# Patient Record
Sex: MALE | Race: WHITE | Employment: FULL TIME | URBAN - NONMETROPOLITAN AREA
[De-identification: names, ages, dates, MRNs, and addresses within clinical notes are randomized per-mention and may not be internally consistent; named-entity substitution may affect disease eponyms.]

---

## 2018-02-23 ENCOUNTER — OFFICE VISIT (OUTPATIENT)
Dept: FAMILY MEDICINE CLINIC | Age: 38
End: 2018-02-23
Payer: COMMERCIAL

## 2018-02-23 ENCOUNTER — TELEPHONE (OUTPATIENT)
Dept: FAMILY MEDICINE CLINIC | Age: 38
End: 2018-02-23

## 2018-02-23 VITALS
WEIGHT: 165.4 LBS | HEART RATE: 94 BPM | SYSTOLIC BLOOD PRESSURE: 140 MMHG | BODY MASS INDEX: 24.5 KG/M2 | DIASTOLIC BLOOD PRESSURE: 68 MMHG | HEIGHT: 69 IN

## 2018-02-23 DIAGNOSIS — R00.9 ELEVATED HEART RATE WITH ELEVATED BLOOD PRESSURE WITHOUT DIAGNOSIS OF HYPERTENSION: ICD-10-CM

## 2018-02-23 DIAGNOSIS — M79.671 RIGHT FOOT PAIN: Primary | ICD-10-CM

## 2018-02-23 DIAGNOSIS — R03.0 ELEVATED HEART RATE WITH ELEVATED BLOOD PRESSURE WITHOUT DIAGNOSIS OF HYPERTENSION: ICD-10-CM

## 2018-02-23 DIAGNOSIS — F17.200 SMOKING: ICD-10-CM

## 2018-02-23 DIAGNOSIS — L24.89 IRRITANT CONTACT DERMATITIS DUE TO OTHER AGENTS: ICD-10-CM

## 2018-02-23 PROCEDURE — 99203 OFFICE O/P NEW LOW 30 MIN: CPT | Performed by: FAMILY MEDICINE

## 2018-02-23 RX ORDER — NAPROXEN 500 MG/1
500 TABLET ORAL 2 TIMES DAILY WITH MEALS
Qty: 60 TABLET | Refills: 0 | Status: SHIPPED | OUTPATIENT
Start: 2018-02-23 | End: 2019-01-18 | Stop reason: ALTCHOICE

## 2018-02-23 ASSESSMENT — ENCOUNTER SYMPTOMS
WHEEZING: 0
NAUSEA: 0
SHORTNESS OF BREATH: 0
VOMITING: 0
BACK PAIN: 0

## 2018-02-23 NOTE — PROGRESS NOTES
use Not on file      No current outpatient prescriptions on file. No current facility-administered medications for this visit. No Known Allergies  Health Maintenance   Topic Date Due    HIV screen  10/05/1994    DTaP/Tdap/Td vaccine (1 - Tdap) 10/05/1998    Flu vaccine (1) 09/01/2017       Objective:  BP (!) 140/68   Pulse 94   Ht 5' 9\" (1.753 m)   Wt 165 lb 6.4 oz (75 kg)   BMI 24.43 kg/m²   Physical Exam   Constitutional: He is oriented to person, place, and time. He appears well-developed and well-nourished. HENT:   Right Ear: External ear normal.   Left Ear: External ear normal.   Mouth/Throat: Oropharynx is clear and moist. No oropharyngeal exudate. Cardiovascular: Normal rate, regular rhythm and normal heart sounds. No murmur heard. Pulmonary/Chest: Effort normal and breath sounds normal. No respiratory distress. He has no wheezes. Abdominal: Soft. He exhibits no distension. There is no tenderness. Neurological: He is alert and oriented to person, place, and time. Skin: Skin is warm. Left axilla with raised erythematous area. No drainage. No vesicles. Psychiatric: His speech is normal and behavior is normal. His mood appears anxious. Vitals reviewed. Right foot pain:  ttp of medial ankle to medial foot. No ecchymosis. Full AROM in dorsiflexion, plantarflexion, inversion, and eversion. Strength is 5/5 dorsiflexion, 5/5 plantarflexion, 5/5 inversion, and 5/5 eversion. Negative bump test.  Negative squeeze test.  Distal sensation and pulses are intact in RLE. Impression/Plan:  1. Right foot pain  -right foot pain due to tarsal tunnel syndrome? Will refer back to podiatry for possible surgical management. - External Referral To Podiatry  -start naproxen (NAPROSYN) 500 MG tablet; Take 1 tablet by mouth 2 times daily (with meals)  Dispense: 60 tablet; Refill: 0    2.  Elevated heart rate with elevated blood pressure without diagnosis of hypertension  -monitor HR and BP. May be due to pain. 3. Smoking  Recommend cessation. Encouraged patient in his efforts to quit    4. Irritant contact dermatitis due to other agents  Recommend otc hydrocortisone cream on left axilla rash. He had changed deodarents recently. Call if not improving. They voiced understanding. All questions answered. They agreed with treatment plan. Educational materials given - see patient instructions. Discussed use, benefit, and side effects of prescribed medications. Reviewed health maintenance. Return in about 1 month (around 3/23/2018) for Well; nurse visit in 1 week for BP,.       Electronically signed by Zoya Meade MD on 2/23/2018 at 9:20 AM

## 2018-03-16 ENCOUNTER — TELEPHONE (OUTPATIENT)
Dept: FAMILY MEDICINE CLINIC | Age: 38
End: 2018-03-16

## 2018-03-23 ENCOUNTER — TELEPHONE (OUTPATIENT)
Dept: FAMILY MEDICINE CLINIC | Age: 38
End: 2018-03-23

## 2019-01-18 ENCOUNTER — OFFICE VISIT (OUTPATIENT)
Dept: FAMILY MEDICINE CLINIC | Age: 39
End: 2019-01-18
Payer: COMMERCIAL

## 2019-01-18 VITALS
WEIGHT: 187.2 LBS | HEIGHT: 69 IN | BODY MASS INDEX: 27.73 KG/M2 | SYSTOLIC BLOOD PRESSURE: 130 MMHG | HEART RATE: 84 BPM | DIASTOLIC BLOOD PRESSURE: 82 MMHG

## 2019-01-18 DIAGNOSIS — M54.2 NECK PAIN: Primary | ICD-10-CM

## 2019-01-18 DIAGNOSIS — M54.12 CERVICAL RADICULOPATHY: ICD-10-CM

## 2019-01-18 DIAGNOSIS — M48.02 FORAMINAL STENOSIS OF CERVICAL REGION: ICD-10-CM

## 2019-01-18 PROBLEM — E05.90 HYPERTHYROIDISM: Status: ACTIVE | Noted: 2019-01-18

## 2019-01-18 PROCEDURE — 99214 OFFICE O/P EST MOD 30 MIN: CPT | Performed by: FAMILY MEDICINE

## 2019-01-18 RX ORDER — METHIMAZOLE 10 MG/1
10 TABLET ORAL 2 TIMES DAILY
COMMUNITY
End: 2022-01-28

## 2019-01-18 RX ORDER — PREDNISONE 20 MG/1
20 TABLET ORAL 2 TIMES DAILY
Qty: 10 TABLET | Refills: 0 | Status: SHIPPED | OUTPATIENT
Start: 2019-01-18 | End: 2019-01-23

## 2019-01-18 RX ORDER — ATENOLOL 25 MG/1
25 TABLET ORAL DAILY
COMMUNITY
End: 2022-01-28 | Stop reason: ALTCHOICE

## 2019-01-18 RX ORDER — TIZANIDINE 4 MG/1
4 TABLET ORAL 3 TIMES DAILY
Qty: 30 TABLET | Refills: 0 | Status: SHIPPED | OUTPATIENT
Start: 2019-01-18 | End: 2019-02-14 | Stop reason: ALTCHOICE

## 2019-01-18 ASSESSMENT — PATIENT HEALTH QUESTIONNAIRE - PHQ9
SUM OF ALL RESPONSES TO PHQ QUESTIONS 1-9: 0
SUM OF ALL RESPONSES TO PHQ QUESTIONS 1-9: 0
SUM OF ALL RESPONSES TO PHQ9 QUESTIONS 1 & 2: 0
1. LITTLE INTEREST OR PLEASURE IN DOING THINGS: 0
2. FEELING DOWN, DEPRESSED OR HOPELESS: 0

## 2019-01-18 ASSESSMENT — ENCOUNTER SYMPTOMS: BACK PAIN: 1

## 2019-02-14 ENCOUNTER — OFFICE VISIT (OUTPATIENT)
Dept: FAMILY MEDICINE CLINIC | Age: 39
End: 2019-02-14
Payer: COMMERCIAL

## 2019-02-14 VITALS
BODY MASS INDEX: 27.77 KG/M2 | WEIGHT: 187.5 LBS | HEIGHT: 69 IN | HEART RATE: 80 BPM | SYSTOLIC BLOOD PRESSURE: 122 MMHG | DIASTOLIC BLOOD PRESSURE: 74 MMHG

## 2019-02-14 DIAGNOSIS — M54.2 NECK PAIN: Primary | ICD-10-CM

## 2019-02-14 DIAGNOSIS — M54.12 CERVICAL RADICULOPATHY: ICD-10-CM

## 2019-02-14 DIAGNOSIS — M48.02 FORAMINAL STENOSIS OF CERVICAL REGION: ICD-10-CM

## 2019-02-14 PROCEDURE — 99213 OFFICE O/P EST LOW 20 MIN: CPT | Performed by: FAMILY MEDICINE

## 2019-02-14 RX ORDER — GABAPENTIN 300 MG/1
300 CAPSULE ORAL 3 TIMES DAILY
Qty: 90 CAPSULE | Refills: 0 | Status: SHIPPED | OUTPATIENT
Start: 2019-02-14 | End: 2022-01-28

## 2019-03-07 ENCOUNTER — PROCEDURE VISIT (OUTPATIENT)
Dept: NEUROLOGY | Age: 39
End: 2019-03-07
Payer: COMMERCIAL

## 2019-03-07 DIAGNOSIS — M79.601 BILATERAL ARM PAIN: ICD-10-CM

## 2019-03-07 DIAGNOSIS — M79.602 BILATERAL ARM PAIN: ICD-10-CM

## 2019-03-07 DIAGNOSIS — M54.2 NECK PAIN: ICD-10-CM

## 2019-03-07 DIAGNOSIS — R20.0 BILATERAL HAND NUMBNESS: ICD-10-CM

## 2019-03-07 DIAGNOSIS — M54.12 CERVICAL RADICULOPATHY: Primary | ICD-10-CM

## 2019-03-07 PROCEDURE — 95911 NRV CNDJ TEST 9-10 STUDIES: CPT | Performed by: PSYCHIATRY & NEUROLOGY

## 2019-03-07 PROCEDURE — 95886 MUSC TEST DONE W/N TEST COMP: CPT | Performed by: PSYCHIATRY & NEUROLOGY

## 2019-03-13 ENCOUNTER — TELEPHONE (OUTPATIENT)
Dept: FAMILY MEDICINE CLINIC | Age: 39
End: 2019-03-13

## 2019-03-13 PROBLEM — G56.03 BILATERAL CARPAL TUNNEL SYNDROME: Status: ACTIVE | Noted: 2019-03-13

## 2019-05-16 ENCOUNTER — OFFICE VISIT (OUTPATIENT)
Dept: FAMILY MEDICINE CLINIC | Age: 39
End: 2019-05-16
Payer: COMMERCIAL

## 2019-05-16 VITALS
HEIGHT: 69 IN | BODY MASS INDEX: 28.17 KG/M2 | HEART RATE: 80 BPM | SYSTOLIC BLOOD PRESSURE: 130 MMHG | WEIGHT: 190.2 LBS | DIASTOLIC BLOOD PRESSURE: 78 MMHG

## 2019-05-16 DIAGNOSIS — Z13.1 SCREENING FOR DIABETES MELLITUS: ICD-10-CM

## 2019-05-16 DIAGNOSIS — F17.200 SMOKING: ICD-10-CM

## 2019-05-16 DIAGNOSIS — Z13.220 SCREENING FOR CHOLESTEROL LEVEL: ICD-10-CM

## 2019-05-16 DIAGNOSIS — Z00.00 WELL ADULT EXAM: Primary | ICD-10-CM

## 2019-05-16 PROCEDURE — 99395 PREV VISIT EST AGE 18-39: CPT | Performed by: FAMILY MEDICINE

## 2019-05-16 ASSESSMENT — ENCOUNTER SYMPTOMS
SHORTNESS OF BREATH: 0
ABDOMINAL PAIN: 0
NAUSEA: 0
BACK PAIN: 0
PHOTOPHOBIA: 0
SORE THROAT: 0
EYE DISCHARGE: 0
WHEEZING: 0

## 2019-05-16 NOTE — PROGRESS NOTES
[facility] PROFESSIONAL SERVS  [facility]  [address] [address]  Dept: [phone]  Dept Fax: [phone]  Loc: [phone]  PROGRESS NOTE      Visit Date: 5/16/2019    Evie Bradshaw is a 43 y.o. male who presents today for:  Chief Complaint   Patient presents with    Wellness Program       Subjective:  HPI     Well adult exam    Exercise:  His job. Diet:  No particular diet  Last Dentist appt:  1.5 years ago  Last optometry appt:  recent  Sleep:  Not good due to neck pain  Mood:  good  Other concerns:       Seen in [place] by endo for hyperthyroidism. Had radioactive Iodine treatment recently. Ongoing neck pain. Referred to [facility]. Works at [employer]    Review of Systems   Constitutional: Negative for fever and unexpected weight change. HENT: Negative for ear pain and sore throat. Eyes: Negative for photophobia and discharge. Respiratory: Negative for shortness of breath and wheezing. Cardiovascular: Negative for chest pain and leg swelling. Gastrointestinal: Negative for abdominal pain and nausea. Endocrine: Positive for heat intolerance. Negative for cold intolerance. Genitourinary: Negative for dysuria and frequency. Musculoskeletal: Positive for neck pain. Negative for back pain. Skin: Negative for pallor and rash. Neurological: Negative for syncope and light-headedness. Hematological: Negative for adenopathy. Does not bruise/bleed easily. Psychiatric/Behavioral: Positive for sleep disturbance. Negative for confusion. The patient is not nervous/anxious. Patient Active Problem List   Diagnosis    Smoking    Hyperthyroidism    Neck pain    Foraminal stenosis of cervical region    Cervical radiculopathy    Bilateral carpal tunnel syndrome     Past Medical History:   Diagnosis Date    Hyperthyroidism 2018      No past surgical history on file.   Family History   Problem Relation Age of Onset    Cancer Father Social History     Tobacco Use    Smoking status: Current Every Day Smoker     Packs/day: 0.50    Smokeless tobacco: Never Used    Tobacco comment: Trying to quit   Substance Use Topics    Alcohol use: Not on file      Current Outpatient Medications   Medication Sig Dispense Refill    atenolol (TENORMIN) 25 MG tablet Take 25 mg by mouth daily      methimazole (TAPAZOLE) 10 MG tablet Take 10 mg by mouth 2 times daily      gabapentin (NEURONTIN) 300 MG capsule Take 1 capsule by mouth 3 times daily for 30 days. Intended supply: 30 days. 90 capsule 0     No current facility-administered medications for this visit. No Known Allergies  Health Maintenance   Topic Date Due    TSH testing  1979    Pneumococcal 0-64 years Vaccine (1 of 1 - PPSV23) 10/05/1985    HIV screen  10/05/1994    DTaP/Tdap/Td vaccine (1 - Tdap) 10/05/1998    Flu vaccine (Season Ended) 09/01/2019       Objective:  /78 (Site: Left Upper Arm, Position: Sitting, Cuff Size: Large Adult)   Pulse 80   Ht 5' 9\" (1.753 m)   Wt 190 lb 3.2 oz (86.3 kg)   BMI 28.09 kg/m²   Physical Exam   Constitutional: He is oriented to person, place, and time. He appears well-developed and well-nourished. No distress. HENT:   Head: Normocephalic and atraumatic. Right Ear: External ear normal.   Left Ear: External ear normal.   Mouth/Throat: Oropharynx is clear and moist. No oropharyngeal exudate. Eyes: Right eye exhibits no discharge. Left eye exhibits no discharge. Cardiovascular: Normal rate, regular rhythm and normal heart sounds. Exam reveals no gallop and no friction rub. No murmur heard. Pulmonary/Chest: Effort normal and breath sounds normal. No respiratory distress. He has no wheezes. Abdominal: Soft. He exhibits no distension and no mass. There is no tenderness. There is no rebound and no guarding. Musculoskeletal: He exhibits no edema. Neurological: He is alert and oriented to person, place, and time.    Skin: Skin is warm. No rash noted. No erythema. Psychiatric: He has a normal mood and affect. His behavior is normal.   Vitals reviewed. Impression/Plan:  1. Well adult exam  Well adult  Preventative maintenance handout provided and discussed    2. Smoking  Recommend cessation    3. Screening for cholesterol level  - Lipid Panel; Future    4. Screening for diabetes mellitus  - Glucose, Fasting; Future    He states he has not been able to schedule appt with [facility] for his neck pain and is unsure why. We called [facility] and he is in collections and needs to pay prior to being seen for his neck pain    They voiced understanding. All questions answered. They agreed with treatment plan. See patient instructions for any educational materials that may have been given. Discussed use, benefit, and side effects of prescribed medications. Reviewed health maintenance. (Please note that portions of this note may have been completed with a voice recognition program.  Efforts were made to edit the dictation but occasionally words are mis-transcribed.)    Return in about 1 year (around 5/16/2020) for Well.       Electronically signed by Maryanna Severin, MD on 5/16/2019 at 10:17 AM

## 2019-05-16 NOTE — PATIENT INSTRUCTIONS
You may receive a survey regarding the care you received during your visit. Your input is valuable to us. We encourage you to complete and return your survey. We hope you will choose us in the future for your healthcare needs. Patient Education        Well Visit, Ages 25 to 48: Care Instructions  Your Care Instructions    Physical exams can help you stay healthy. Your doctor has checked your overall health and may have suggested ways to take good care of yourself. He or she also may have recommended tests. At home, you can help prevent illness with healthy eating, regular exercise, and other steps. Follow-up care is a key part of your treatment and safety. Be sure to make and go to all appointments, and call your doctor if you are having problems. It's also a good idea to know your test results and keep a list of the medicines you take. How can you care for yourself at home? · Reach and stay at a healthy weight. This will lower your risk for many problems, such as obesity, diabetes, heart disease, and high blood pressure. · Get at least 30 minutes of physical activity on most days of the week. Walking is a good choice. You also may want to do other activities, such as running, swimming, cycling, or playing tennis or team sports. Discuss any changes in your exercise program with your doctor. · Do not smoke or allow others to smoke around you. If you need help quitting, talk to your doctor about stop-smoking programs and medicines. These can increase your chances of quitting for good. · Talk to your doctor about whether you have any risk factors for sexually transmitted infections (STIs). Having one sex partner (who does not have STIs and does not have sex with anyone else) is a good way to avoid these infections. · Use birth control if you do not want to have children at this time. Talk with your doctor about the choices available and what might be best for you. · Protect your skin from too much sun.  When you're outdoors from 10 a.m. to 4 p.m., stay in the shade or cover up with clothing and a hat with a wide brim. Wear sunglasses that block UV rays. Even when it's cloudy, put broad-spectrum sunscreen (SPF 30 or higher) on any exposed skin. · See a dentist one or two times a year for checkups and to have your teeth cleaned. · Wear a seat belt in the car. Follow your doctor's advice about when to have certain tests. These tests can spot problems early. For everyone  · Cholesterol. Have the fat (cholesterol) in your blood tested after age 21. Your doctor will tell you how often to have this done based on your age, family history, or other things that can increase your risk for heart disease. · Blood pressure. Have your blood pressure checked during a routine doctor visit. Your doctor will tell you how often to check your blood pressure based on your age, your blood pressure results, and other factors. · Vision. Talk with your doctor about how often to have a glaucoma test.  · Diabetes. Ask your doctor whether you should have tests for diabetes. · Colon cancer. Your risk for colorectal cancer gets higher as you get older. Some experts say that adults should start regular screening at age 48 and stop at age 76. Others say to start before age 48 or continue after age 76. Talk with your doctor about your risk and when to start and stop screening. For women  · Breast exam and mammogram. Talk to your doctor about when you should have a clinical breast exam and a mammogram. Medical experts differ on whether and how often women under 50 should have these tests. Your doctor can help you decide what is right for you. · Cervical cancer screening test and pelvic exam. Begin with a Pap test at age 24. The test often is part of a pelvic exam. Starting at age 27, you may choose to have a Pap test, an HPV test, or both tests at the same time (called co-testing). Talk with your doctor about how often to have testing.   · Tests for sexually transmitted infections (STIs). Ask whether you should have tests for STIs. You may be at risk if you have sex with more than one person, especially if your partners do not wear condoms. For men  · Tests for sexually transmitted infections (STIs). Ask whether you should have tests for STIs. You may be at risk if you have sex with more than one person, especially if you do not wear a condom. · Testicular cancer exam. Ask your doctor whether you should check your testicles regularly. · Prostate exam. Talk to your doctor about whether you should have a blood test (called a PSA test) for prostate cancer. Experts differ on whether and when men should have this test. Some experts suggest it if you are older than 39 and are -American or have a father or brother who got prostate cancer when he was younger than 72. When should you call for help? Watch closely for changes in your health, and be sure to contact your doctor if you have any problems or symptoms that concern you. Where can you learn more? Go to https://[email or web address]healthSHADO. org and sign in to your [employer] account. Enter P072 in the [employer] box to learn more about \"Well Visit, Ages 25 to 48: Care Instructions. \"     If you do not have an account, please click on the \"Sign Up Now\" link. Current as of: December 13, 2018  Content Version: 12.0  © 9758-7569 Healthwise, Incorporated. Care instructions adapted under license by [facility] ([facility]). If you have questions about a medical condition or this instruction, always ask your healthcare professional. [place] [address] any warranty or liability for your use of this information.

## 2022-01-28 ENCOUNTER — OFFICE VISIT (OUTPATIENT)
Dept: FAMILY MEDICINE CLINIC | Age: 42
End: 2022-01-28
Payer: COMMERCIAL

## 2022-01-28 VITALS
HEIGHT: 69 IN | SYSTOLIC BLOOD PRESSURE: 148 MMHG | OXYGEN SATURATION: 97 % | BODY MASS INDEX: 28.73 KG/M2 | DIASTOLIC BLOOD PRESSURE: 86 MMHG | HEART RATE: 91 BPM | TEMPERATURE: 97.2 F | WEIGHT: 194 LBS | RESPIRATION RATE: 18 BRPM

## 2022-01-28 DIAGNOSIS — M54.12 CERVICAL RADICULOPATHY: ICD-10-CM

## 2022-01-28 DIAGNOSIS — M54.2 NECK PAIN: Primary | ICD-10-CM

## 2022-01-28 PROCEDURE — 99213 OFFICE O/P EST LOW 20 MIN: CPT | Performed by: FAMILY MEDICINE

## 2022-01-28 RX ORDER — CYCLOBENZAPRINE HCL 10 MG
10 TABLET ORAL 3 TIMES DAILY PRN
Qty: 30 TABLET | Refills: 0 | Status: SHIPPED | OUTPATIENT
Start: 2022-01-28 | End: 2022-02-07

## 2022-01-28 RX ORDER — PREDNISONE 20 MG/1
20 TABLET ORAL 2 TIMES DAILY
Qty: 10 TABLET | Refills: 0 | Status: SHIPPED | OUTPATIENT
Start: 2022-01-28 | End: 2022-02-02

## 2022-01-28 RX ORDER — SILDENAFIL CITRATE 20 MG/1
TABLET ORAL
COMMUNITY
Start: 2021-11-19

## 2022-01-28 RX ORDER — HYDROCODONE BITARTRATE AND ACETAMINOPHEN 5; 325 MG/1; MG/1
1 TABLET ORAL EVERY 4 HOURS PRN
COMMUNITY
Start: 2022-01-23 | End: 2022-01-28 | Stop reason: ALTCHOICE

## 2022-01-28 RX ORDER — DIAZEPAM 2 MG/1
2 TABLET ORAL EVERY 6 HOURS PRN
COMMUNITY
Start: 2022-01-23 | End: 2022-01-28 | Stop reason: ALTCHOICE

## 2022-01-28 RX ORDER — PANTOPRAZOLE SODIUM 20 MG/1
20 TABLET, DELAYED RELEASE ORAL DAILY
COMMUNITY
End: 2022-01-28 | Stop reason: ALTCHOICE

## 2022-01-28 SDOH — ECONOMIC STABILITY: FOOD INSECURITY: WITHIN THE PAST 12 MONTHS, YOU WORRIED THAT YOUR FOOD WOULD RUN OUT BEFORE YOU GOT MONEY TO BUY MORE.: NEVER TRUE

## 2022-01-28 SDOH — ECONOMIC STABILITY: FOOD INSECURITY: WITHIN THE PAST 12 MONTHS, THE FOOD YOU BOUGHT JUST DIDN'T LAST AND YOU DIDN'T HAVE MONEY TO GET MORE.: NEVER TRUE

## 2022-01-28 ASSESSMENT — PATIENT HEALTH QUESTIONNAIRE - PHQ9
1. LITTLE INTEREST OR PLEASURE IN DOING THINGS: 0
SUM OF ALL RESPONSES TO PHQ QUESTIONS 1-9: 0
SUM OF ALL RESPONSES TO PHQ9 QUESTIONS 1 & 2: 0
2. FEELING DOWN, DEPRESSED OR HOPELESS: 0

## 2022-01-28 ASSESSMENT — SOCIAL DETERMINANTS OF HEALTH (SDOH): HOW HARD IS IT FOR YOU TO PAY FOR THE VERY BASICS LIKE FOOD, HOUSING, MEDICAL CARE, AND HEATING?: NOT HARD AT ALL

## 2022-01-28 NOTE — PROGRESS NOTES
[facility]  [facility] MEDICINE  [address] [facility]  Dept: [phone]  Dept Fax: [phone]  Loc: [phone]  PROGRESS NOTE      Visit Date: 1/28/2022    Augie Ornelas is a 42 y.o. male who presents today for:  Chief Complaint   Patient presents with    Spasms     Neck, shoulders, and chest; 1 week onset       Subjective:  HPI    Neck, shoulder, chest pain. Started 1 week ago when he awoke with this. Seen at [address] [place] [address] ER on 1/23. Tried heating pad, icy hot, massage. He was treated with valium, ibuprofen, and norco (3 pills). had neck xrays. He was referred to PT but did not make appt yet. Sometimes gets numbing feeling in left arm. Had MRI c-spine in 2018. Seen in feb 2019 by this physician. Did not get appt at [facility].  EMG in 2019 showed C6-7 chronic radiculopathy    Review of Systems   Constitutional: Negative for chills and fever. Musculoskeletal: Positive for arthralgias and neck pain. Patient Active Problem List   Diagnosis    Smoking    Hyperthyroidism    Neck pain    Foraminal stenosis of cervical region    Cervical radiculopathy    Bilateral carpal tunnel syndrome     Past Medical History:   Diagnosis Date    Hyperthyroidism 2018      History reviewed. No pertinent surgical history. Family History   Problem Relation Age of Onset    Cancer Father      Social History     Tobacco Use    Smoking status: Current Every Day Smoker     Packs/day: 0.50    Smokeless tobacco: Never Used    Tobacco comment: Trying to quit   Substance Use Topics    Alcohol use: Not on file      Current Outpatient Medications   Medication Sig Dispense Refill    sildenafil (REVATIO) 20 MG tablet Take 3 tablets by mouth one hour prior to sex as needed. (Patient not taking: Reported on 1/28/2022)       No current facility-administered medications for this visit.      No Known Allergies  Health Maintenance   Topic Date Due    TSH testing Never done    Hepatitis C screen  Never done    COVID-19 Vaccine (1) Never done    Pneumococcal 0-64 years Vaccine (1 of 2 - PPSV23) Never done    Depression Screen  Never done    HIV screen  Never done    DTaP/Tdap/Td vaccine (1 - Tdap) Never done    Lipid screen  Never done    Flu vaccine (1) Never done    Hepatitis A vaccine  Aged Out    Hepatitis B vaccine  Aged Out    Hib vaccine  Aged Out    Meningococcal (ACWY) vaccine  Aged Out       Objective:  BP (!) 148/86 (Site: Left Upper Arm, Position: Sitting, Cuff Size: Medium Adult)   Pulse 91   Temp 97.2 °F (36.2 °C) (Temporal)   Resp 18   Ht 5' 9\" (1.753 m)   Wt 194 lb (88 kg)   SpO2 97%   BMI 28.65 kg/m²   Physical Exam  Vitals reviewed. Constitutional:       General: He is not in acute distress. Appearance: He is not ill-appearing. Comments: Appears uncomfortable   Neurological:      Mental Status: He is alert. Neck:  ttp of left upper trapezius with muscle spasm present. Very limited ROM for flex, ext, lateral flexion bilaterally and rotation bilaterally    Strength:  Right Upper Extremity  Left Upper Extremity  Shoulder abduction:  5/5    5/5  Elbow flexion:   5/5    5/5  Wrist extension:  5/5    5/5  Elbow extension:  5/5    5/5  Wrist flexion:   5/5    5/5  Finger abduction:  5/5    5/5      Impression/Plan:  1. Neck pain  Neck pain with hx of C6-C7 cervical radiculopathy. Very limited ROM. No weakness. Try prednisone and flexeril. Tylenol. Refer for PT  - predniSONE (DELTASONE) 20 MG tablet; Take 1 tablet by mouth 2 times daily for 5 days  Dispense: 10 tablet; Refill: 0  - External Referral To Physical Therapy  - cyclobenzaprine (FLEXERIL) 10 MG tablet; Take 1 tablet by mouth 3 times daily as needed for Muscle spasms  Dispense: 30 tablet; Refill: 0    2. Cervical radiculopathy  - predniSONE (DELTASONE) 20 MG tablet; Take 1 tablet by mouth 2 times daily for 5 days  Dispense: 10 tablet;  Refill: 0  - External Referral To Physical Therapy  - cyclobenzaprine (FLEXERIL) 10 MG tablet; Take 1 tablet by mouth 3 times daily as needed for Muscle spasms  Dispense: 30 tablet; Refill: 0    Recheck BP at next appt given neck pain    They voiced understanding. All questions answered. They agreed with treatment plan. See patient instructions for any educational materials that may have been given. Discussed use, benefit, and side effects of prescribed medications. Reviewed health maintenance. (Please note that portions of this note may have been completed with a voice recognition program.  Efforts were made to edit the dictation but occasionally words are mis-transcribed.)    Return in about 2 weeks (around 2/11/2022) for neck pain.       Electronically signed by Sienna Law MD on 1/28/2022 at 9:26 AM

## 2023-09-06 ENCOUNTER — OFFICE VISIT (OUTPATIENT)
Dept: FAMILY MEDICINE CLINIC | Age: 43
End: 2023-09-06
Payer: COMMERCIAL

## 2023-09-06 ENCOUNTER — HOSPITAL ENCOUNTER (OUTPATIENT)
Age: 43
Discharge: HOME | End: 2023-09-06
Payer: COMMERCIAL

## 2023-09-06 VITALS
WEIGHT: 200 LBS | BODY MASS INDEX: 29.62 KG/M2 | HEART RATE: 93 BPM | OXYGEN SATURATION: 97 % | DIASTOLIC BLOOD PRESSURE: 80 MMHG | SYSTOLIC BLOOD PRESSURE: 130 MMHG | RESPIRATION RATE: 18 BRPM | TEMPERATURE: 97 F | HEIGHT: 69 IN

## 2023-09-06 DIAGNOSIS — Z13.1 DIABETES MELLITUS SCREENING: ICD-10-CM

## 2023-09-06 DIAGNOSIS — E05.00 GRAVES DISEASE: ICD-10-CM

## 2023-09-06 DIAGNOSIS — R00.0 TACHYCARDIA: Primary | ICD-10-CM

## 2023-09-06 DIAGNOSIS — Z13.220 SCREENING FOR HYPERLIPIDEMIA: ICD-10-CM

## 2023-09-06 DIAGNOSIS — R00.0 TACHYCARDIA: ICD-10-CM

## 2023-09-06 LAB
BASOPHILS ABSOLUTE: 0.1 THOU/MM3 (ref 0–0.1)
BASOPHILS NFR BLD AUTO: 0.6 %
DEPRECATED RDW RBC AUTO: 43.5 FL (ref 35–45)
EOSINOPHIL NFR BLD AUTO: 5.3 %
EOSINOPHILS ABSOLUTE: 0.5 THOU/MM3 (ref 0–0.4)
ERYTHROCYTE [DISTWIDTH] IN BLOOD BY AUTOMATED COUNT: 12.5 % (ref 11.5–14.5)
HCT VFR BLD AUTO: 45.7 % (ref 42–52)
HGB BLD-MCNC: 15.3 GM/DL (ref 14–18)
IMM GRANULOCYTES # BLD AUTO: 0.04 THOU/MM3 (ref 0–0.07)
IMM GRANULOCYTES NFR BLD AUTO: 0.4 %
LYMPHOCYTES ABSOLUTE: 2 THOU/MM3 (ref 1–4.8)
LYMPHOCYTES NFR BLD AUTO: 21.5 %
MCH RBC QN AUTO: 31.9 PG (ref 26–33)
MCHC RBC AUTO-ENTMCNC: 33.5 GM/DL (ref 32.2–35.5)
MCV RBC AUTO: 95.4 FL (ref 80–94)
MONOCYTES ABSOLUTE: 0.9 THOU/MM3 (ref 0.4–1.3)
MONOCYTES NFR BLD AUTO: 9.4 %
NEUTROPHILS NFR BLD AUTO: 62.8 %
NRBC BLD AUTO-RTO: 0 /100 WBC
PLATELET # BLD AUTO: 222 THOU/MM3 (ref 130–400)
PMV BLD AUTO: 10.6 FL (ref 9.4–12.4)
RBC # BLD AUTO: 4.79 MILL/MM3 (ref 4.7–6.1)
SEGMENTED NEUTROPHILS ABSOLUTE COUNT: 5.7 THOU/MM3 (ref 1.8–7.7)
WBC # BLD AUTO: 9.1 THOU/MM3 (ref 4.8–10.8)

## 2023-09-06 PROCEDURE — 93000 ELECTROCARDIOGRAM COMPLETE: CPT | Performed by: FAMILY MEDICINE

## 2023-09-06 PROCEDURE — 84439 ASSAY OF FREE THYROXINE: CPT

## 2023-09-06 PROCEDURE — 80061 LIPID PANEL: CPT

## 2023-09-06 PROCEDURE — 99214 OFFICE O/P EST MOD 30 MIN: CPT | Performed by: FAMILY MEDICINE

## 2023-09-06 PROCEDURE — 36415 COLL VENOUS BLD VENIPUNCTURE: CPT

## 2023-09-06 PROCEDURE — 84443 ASSAY THYROID STIM HORMONE: CPT

## 2023-09-06 PROCEDURE — 85025 COMPLETE CBC W/AUTO DIFF WBC: CPT

## 2023-09-06 PROCEDURE — 84481 FREE ASSAY (FT-3): CPT

## 2023-09-06 PROCEDURE — 80053 COMPREHEN METABOLIC PANEL: CPT

## 2023-09-06 SDOH — ECONOMIC STABILITY: FOOD INSECURITY: WITHIN THE PAST 12 MONTHS, THE FOOD YOU BOUGHT JUST DIDN'T LAST AND YOU DIDN'T HAVE MONEY TO GET MORE.: NEVER TRUE

## 2023-09-06 SDOH — ECONOMIC STABILITY: INCOME INSECURITY: HOW HARD IS IT FOR YOU TO PAY FOR THE VERY BASICS LIKE FOOD, HOUSING, MEDICAL CARE, AND HEATING?: NOT HARD AT ALL

## 2023-09-06 SDOH — ECONOMIC STABILITY: HOUSING INSECURITY
IN THE LAST 12 MONTHS, WAS THERE A TIME WHEN YOU DID NOT HAVE A STEADY PLACE TO SLEEP OR SLEPT IN A SHELTER (INCLUDING NOW)?: NO

## 2023-09-06 SDOH — ECONOMIC STABILITY: FOOD INSECURITY: WITHIN THE PAST 12 MONTHS, YOU WORRIED THAT YOUR FOOD WOULD RUN OUT BEFORE YOU GOT MONEY TO BUY MORE.: NEVER TRUE

## 2023-09-06 ASSESSMENT — PATIENT HEALTH QUESTIONNAIRE - PHQ9
SUM OF ALL RESPONSES TO PHQ QUESTIONS 1-9: 2
SUM OF ALL RESPONSES TO PHQ9 QUESTIONS 1 & 2: 2
2. FEELING DOWN, DEPRESSED OR HOPELESS: 1
SUM OF ALL RESPONSES TO PHQ QUESTIONS 1-9: 2
1. LITTLE INTEREST OR PLEASURE IN DOING THINGS: 1
SUM OF ALL RESPONSES TO PHQ QUESTIONS 1-9: 2
SUM OF ALL RESPONSES TO PHQ QUESTIONS 1-9: 2

## 2023-09-06 ASSESSMENT — ENCOUNTER SYMPTOMS
SHORTNESS OF BREATH: 1
CONSTIPATION: 0
CHEST TIGHTNESS: 1
DIARRHEA: 0
WHEEZING: 0
COUGH: 1

## 2023-09-07 ENCOUNTER — TELEPHONE (OUTPATIENT)
Dept: FAMILY MEDICINE CLINIC | Age: 43
End: 2023-09-07

## 2023-09-07 DIAGNOSIS — E05.90 HYPERTHYROIDISM: Primary | ICD-10-CM

## 2023-09-07 LAB
ALBUMIN SERPL BCG-MCNC: 4.1 G/DL (ref 3.5–5.1)
ALP SERPL-CCNC: 99 U/L (ref 38–126)
ALT SERPL W/O P-5'-P-CCNC: 14 U/L (ref 11–66)
ANION GAP SERPL CALC-SCNC: 13 MEQ/L (ref 8–16)
AST SERPL-CCNC: 16 U/L (ref 5–40)
BILIRUB SERPL-MCNC: 0.3 MG/DL (ref 0.3–1.2)
BUN SERPL-MCNC: 19 MG/DL (ref 7–22)
CALCIUM SERPL-MCNC: 9.4 MG/DL (ref 8.5–10.5)
CHLORIDE SERPL-SCNC: 107 MEQ/L (ref 98–111)
CHOLEST SERPL-MCNC: 138 MG/DL (ref 100–199)
CO2 SERPL-SCNC: 26 MEQ/L (ref 23–33)
CREAT SERPL-MCNC: 0.9 MG/DL (ref 0.4–1.2)
GFR SERPL CREATININE-BSD FRML MDRD: > 60 ML/MIN/1.73M2
GLUCOSE SERPL-MCNC: 97 MG/DL (ref 70–108)
HDLC SERPL-MCNC: 45 MG/DL
LDLC SERPL CALC-MCNC: 81 MG/DL
POTASSIUM SERPL-SCNC: 4.3 MEQ/L (ref 3.5–5.2)
PROT SERPL-MCNC: 7 G/DL (ref 6.1–8)
SODIUM SERPL-SCNC: 146 MEQ/L (ref 135–145)
T4 FREE SERPL-MCNC: 1.15 NG/DL (ref 0.93–1.76)
TRIGL SERPL-MCNC: 62 MG/DL (ref 0–199)
TSH SERPL DL<=0.005 MIU/L-ACNC: 0.1 UIU/ML (ref 0.4–4.2)

## 2023-09-07 NOTE — TELEPHONE ENCOUNTER
----- Message from Deng Oliver MD sent at 9/7/2023  6:00 AM EDT -----  CMP and CBC are good. Cholesterol levels are excellent. Slightly low TSH with normal free T4. Indeterminate function (TSH being low indicates possible hypothyroidism but free T4 indicates normal thyroid levels). Free T3 pending. Recommend repeat TSH and free T4 in 4 weeks    Please advise patient.   Deng Oilver MD

## 2023-09-07 NOTE — TELEPHONE ENCOUNTER
Patient informed and verbalized understanding. Lab orders pended and will mail to patient once signed.

## 2023-09-08 LAB — T3FREE SERPL-MCNC: 3.21 PG/ML (ref 2.02–4.43)

## 2023-09-11 ENCOUNTER — TELEPHONE (OUTPATIENT)
Dept: FAMILY MEDICINE CLINIC | Age: 43
End: 2023-09-11

## 2023-09-11 NOTE — TELEPHONE ENCOUNTER
----- Message from Oneal Ribeiro MD sent at 9/9/2023  8:40 PM EDT -----  Free t3 is good. Please advise patient.   Oneal Ribeiro MD

## 2023-10-03 ENCOUNTER — HOSPITAL ENCOUNTER (OUTPATIENT)
Dept: NON INVASIVE DIAGNOSTICS | Age: 43
Discharge: HOME | End: 2023-10-03
Attending: FAMILY MEDICINE
Payer: COMMERCIAL

## 2023-10-03 DIAGNOSIS — R00.0 TACHYCARDIA: ICD-10-CM

## 2023-10-03 PROCEDURE — 93270 REMOTE 30 DAY ECG REV/REPORT: CPT

## 2023-10-03 NOTE — PROCEDURES
30 Day cardiac event monitor was applied to patient. Instructions were given and skin/monitor prep and application was demonstrated. Patient was instructed to remove monitor on 11/2 and mail back to [employer].

## 2023-10-26 ENCOUNTER — HOSPITAL ENCOUNTER (OUTPATIENT)
Age: 43
Discharge: HOME | End: 2023-10-26
Payer: COMMERCIAL

## 2023-10-26 ENCOUNTER — OFFICE VISIT (OUTPATIENT)
Dept: FAMILY MEDICINE CLINIC | Age: 43
End: 2023-10-26
Payer: COMMERCIAL

## 2023-10-26 VITALS
RESPIRATION RATE: 16 BRPM | SYSTOLIC BLOOD PRESSURE: 150 MMHG | HEART RATE: 94 BPM | DIASTOLIC BLOOD PRESSURE: 90 MMHG | HEIGHT: 69 IN | WEIGHT: 206.2 LBS | OXYGEN SATURATION: 96 % | BODY MASS INDEX: 30.54 KG/M2 | TEMPERATURE: 97.8 F

## 2023-10-26 DIAGNOSIS — R79.89 ABNORMAL TSH: ICD-10-CM

## 2023-10-26 DIAGNOSIS — R00.0 TACHYCARDIA: Primary | ICD-10-CM

## 2023-10-26 DIAGNOSIS — R03.0 SINGLE EPISODE OF ELEVATED BLOOD PRESSURE: ICD-10-CM

## 2023-10-26 DIAGNOSIS — E05.90 HYPERTHYROIDISM: ICD-10-CM

## 2023-10-26 LAB
T4 FREE SERPL-MCNC: 1.3 NG/DL (ref 0.93–1.76)
TSH SERPL DL<=0.005 MIU/L-ACNC: 0.02 UIU/ML (ref 0.4–4.2)

## 2023-10-26 PROCEDURE — 36415 COLL VENOUS BLD VENIPUNCTURE: CPT

## 2023-10-26 PROCEDURE — 84443 ASSAY THYROID STIM HORMONE: CPT

## 2023-10-26 PROCEDURE — 99212 OFFICE O/P EST SF 10 MIN: CPT | Performed by: FAMILY MEDICINE

## 2023-10-26 PROCEDURE — 84439 ASSAY OF FREE THYROXINE: CPT

## 2023-10-26 ASSESSMENT — ENCOUNTER SYMPTOMS
SHORTNESS OF BREATH: 0
WHEEZING: 0

## 2023-10-26 NOTE — PROGRESS NOTES
[facility]  [address] [facility]  Dept: [phone]  Dept Fax: [phone]  Loc: [phone]  PROGRESS NOTE      Visit Date: 10/26/2023    Joe Hamman is a 39 y.o. male who presents today for:  Chief Complaint   Patient presents with    Tachycardia     6 week follow up. Pt states he hasn't noticed a fast HR in awhile. No new issues or concerns. Chief complaint: Tachycardia    Subjective:  HPI    6-7 week follow-up for tachycardia. Event monitor is in place. Repeat thyroid labs need done as he had normal free T4 with low TSH September. Does not check BP at home. Feels good. No more tachycardia. Review of Systems   Respiratory:  Negative for shortness of breath and wheezing. Cardiovascular:  Negative for chest pain. Neurological:  Negative for dizziness and light-headedness. Patient Active Problem List   Diagnosis    Smoking    Hyperthyroidism    Neck pain    Foraminal stenosis of cervical region    Cervical radiculopathy    Bilateral carpal tunnel syndrome    Graves disease     Past Medical History:   Diagnosis Date    Hyperthyroidism 2018      History reviewed. No pertinent surgical history. Family History   Problem Relation Age of Onset    Cancer Father      Social History     Tobacco Use    Smoking status: Former     Packs/day: .5     Types: Cigarettes     Quit date: 2023     Years since quittin.4     Passive exposure: Past    Smokeless tobacco: Never    Tobacco comments:     Trying to quit   Substance Use Topics    Alcohol use: Not on file      No current outpatient medications on file. No current facility-administered medications for this visit.      No Known Allergies  Health Maintenance   Topic Date Due    Hepatitis B vaccine (1 of 3 - 3-dose series) Never done    COVID-19 Vaccine (1) Never done    HIV screen  Never done    Hepatitis C screen  Never done    DTaP/Tdap/Td vaccine (1 -

## 2023-10-27 ENCOUNTER — TELEPHONE (OUTPATIENT)
Dept: FAMILY MEDICINE CLINIC | Age: 43
End: 2023-10-27

## 2023-10-27 DIAGNOSIS — E05.90 HYPERTHYROIDISM: ICD-10-CM

## 2023-10-27 DIAGNOSIS — R79.89 ABNORMAL TSH: Primary | ICD-10-CM

## 2023-10-27 NOTE — TELEPHONE ENCOUNTER
Patient notified and verbalized understanding  Patient does not have a preference on which endo he would like to see. Please refer to whom you think.

## 2023-10-27 NOTE — TELEPHONE ENCOUNTER
----- Message from Jarvis Rosado MD sent at 10/27/2023  7:31 AM EDT -----  Decreasing TSH with normal free T4. He has a history of having radioactive iodine for hyperthyroidism. Labs are trending towards hyperthyroidism. Recommend referral to endocrinology for further management. Who does he want to see? Please advise patient.   Jarvis Rosado MD

## 2023-10-27 NOTE — TELEPHONE ENCOUNTER
----- Message from Dixie Pizarro MD sent at 10/27/2023  7:31 AM EDT -----  Decreasing TSH with normal free T4. He has a history of having radioactive iodine for hyperthyroidism. Labs are trending towards hyperthyroidism. Recommend referral to endocrinology for further management. Who does he want to see? Please advise patient.   Dixie Pizarro MD

## 2023-11-06 ENCOUNTER — TELEPHONE (OUTPATIENT)
Dept: FAMILY MEDICINE CLINIC | Age: 43
End: 2023-11-06

## 2023-11-06 NOTE — TELEPHONE ENCOUNTER
----- Message from Monique Loving MD sent at 11/6/2023  7:53 AM EST -----  No significant arrhythmias. Good. Please advise patient.   Monique Loving MD

## 2023-11-08 NOTE — TELEPHONE ENCOUNTER
Left detailed message of normal test results, with no significant arrhythmias. Patient to return call with any questions/concerns.

## 2023-12-28 ENCOUNTER — OFFICE VISIT (OUTPATIENT)
Age: 43
End: 2023-12-28
Payer: COMMERCIAL

## 2023-12-28 VITALS
SYSTOLIC BLOOD PRESSURE: 138 MMHG | HEART RATE: 78 BPM | DIASTOLIC BLOOD PRESSURE: 74 MMHG | WEIGHT: 203.2 LBS | BODY MASS INDEX: 30.01 KG/M2

## 2023-12-28 DIAGNOSIS — E05.90 HYPERTHYROIDISM: Primary | ICD-10-CM

## 2023-12-28 PROCEDURE — 99204 OFFICE O/P NEW MOD 45 MIN: CPT | Performed by: INTERNAL MEDICINE
